# Patient Record
Sex: FEMALE | Race: WHITE
[De-identification: names, ages, dates, MRNs, and addresses within clinical notes are randomized per-mention and may not be internally consistent; named-entity substitution may affect disease eponyms.]

---

## 2018-05-09 ENCOUNTER — HOSPITAL ENCOUNTER (OUTPATIENT)
Dept: HOSPITAL 17 - NEPE | Age: 48
Setting detail: OBSERVATION
LOS: 1 days | Discharge: HOME | End: 2018-05-10
Attending: INTERNAL MEDICINE | Admitting: INTERNAL MEDICINE
Payer: COMMERCIAL

## 2018-05-09 VITALS
OXYGEN SATURATION: 97 % | HEART RATE: 88 BPM | DIASTOLIC BLOOD PRESSURE: 56 MMHG | TEMPERATURE: 98.5 F | SYSTOLIC BLOOD PRESSURE: 104 MMHG | RESPIRATION RATE: 18 BRPM

## 2018-05-09 VITALS
DIASTOLIC BLOOD PRESSURE: 72 MMHG | OXYGEN SATURATION: 99 % | HEART RATE: 84 BPM | SYSTOLIC BLOOD PRESSURE: 133 MMHG | RESPIRATION RATE: 19 BRPM

## 2018-05-09 VITALS
SYSTOLIC BLOOD PRESSURE: 136 MMHG | TEMPERATURE: 97.6 F | DIASTOLIC BLOOD PRESSURE: 73 MMHG | HEART RATE: 89 BPM | RESPIRATION RATE: 20 BRPM

## 2018-05-09 VITALS
RESPIRATION RATE: 16 BRPM | TEMPERATURE: 98.2 F | DIASTOLIC BLOOD PRESSURE: 64 MMHG | OXYGEN SATURATION: 96 % | HEART RATE: 87 BPM | SYSTOLIC BLOOD PRESSURE: 117 MMHG

## 2018-05-09 VITALS — OXYGEN SATURATION: 96 %

## 2018-05-09 VITALS — HEART RATE: 84 BPM

## 2018-05-09 DIAGNOSIS — N32.81: ICD-10-CM

## 2018-05-09 DIAGNOSIS — Z79.899: ICD-10-CM

## 2018-05-09 DIAGNOSIS — K21.9: ICD-10-CM

## 2018-05-09 DIAGNOSIS — I10: ICD-10-CM

## 2018-05-09 DIAGNOSIS — R11.0: ICD-10-CM

## 2018-05-09 DIAGNOSIS — R94.31: ICD-10-CM

## 2018-05-09 DIAGNOSIS — R07.89: Primary | ICD-10-CM

## 2018-05-09 DIAGNOSIS — R61: ICD-10-CM

## 2018-05-09 DIAGNOSIS — E66.9: ICD-10-CM

## 2018-05-09 DIAGNOSIS — Z82.49: ICD-10-CM

## 2018-05-09 DIAGNOSIS — R06.02: ICD-10-CM

## 2018-05-09 LAB
ALBUMIN SERPL-MCNC: 3.6 GM/DL (ref 3.4–5)
ALP SERPL-CCNC: 117 U/L (ref 45–117)
ALT SERPL-CCNC: 22 U/L (ref 10–53)
AST SERPL-CCNC: 14 U/L (ref 15–37)
BASOPHILS # BLD AUTO: 0.1 TH/MM3 (ref 0–0.2)
BASOPHILS NFR BLD: 0.5 % (ref 0–2)
BILIRUB SERPL-MCNC: 0.3 MG/DL (ref 0.2–1)
BUN SERPL-MCNC: 12 MG/DL (ref 7–18)
CALCIUM SERPL-MCNC: 9 MG/DL (ref 8.5–10.1)
CHLORIDE SERPL-SCNC: 99 MEQ/L (ref 98–107)
CREAT SERPL-MCNC: 0.76 MG/DL (ref 0.5–1)
EOSINOPHIL # BLD: 0.1 TH/MM3 (ref 0–0.4)
EOSINOPHIL NFR BLD: 1.2 % (ref 0–4)
ERYTHROCYTE [DISTWIDTH] IN BLOOD BY AUTOMATED COUNT: 14.3 % (ref 11.6–17.2)
GFR SERPLBLD BASED ON 1.73 SQ M-ARVRAT: 81 ML/MIN (ref 89–?)
GLUCOSE SERPL-MCNC: 97 MG/DL (ref 74–106)
HCO3 BLD-SCNC: 29.4 MEQ/L (ref 21–32)
HCT VFR BLD CALC: 41.7 % (ref 35–46)
HGB BLD-MCNC: 14 GM/DL (ref 11.6–15.3)
INR PPP: 0.9 RATIO
LYMPHOCYTES # BLD AUTO: 3.4 TH/MM3 (ref 1–4.8)
LYMPHOCYTES NFR BLD AUTO: 29.3 % (ref 9–44)
MCH RBC QN AUTO: 29.2 PG (ref 27–34)
MCHC RBC AUTO-ENTMCNC: 33.6 % (ref 32–36)
MCV RBC AUTO: 86.8 FL (ref 80–100)
MONOCYTE #: 0.6 TH/MM3 (ref 0–0.9)
MONOCYTES NFR BLD: 5.6 % (ref 0–8)
NEUTROPHILS # BLD AUTO: 7.3 TH/MM3 (ref 1.8–7.7)
NEUTROPHILS NFR BLD AUTO: 63.4 % (ref 16–70)
PLATELET # BLD: 453 TH/MM3 (ref 150–450)
PMV BLD AUTO: 6.9 FL (ref 7–11)
PROT SERPL-MCNC: 8 GM/DL (ref 6.4–8.2)
PROTHROMBIN TIME: 9.6 SEC (ref 9.8–11.6)
RBC # BLD AUTO: 4.8 MIL/MM3 (ref 4–5.3)
SODIUM SERPL-SCNC: 136 MEQ/L (ref 136–145)
TROPONIN I SERPL-MCNC: (no result) NG/ML (ref 0.02–0.05)
WBC # BLD AUTO: 11.5 TH/MM3 (ref 4–11)

## 2018-05-09 PROCEDURE — 83690 ASSAY OF LIPASE: CPT

## 2018-05-09 PROCEDURE — 71045 X-RAY EXAM CHEST 1 VIEW: CPT

## 2018-05-09 PROCEDURE — 93005 ELECTROCARDIOGRAM TRACING: CPT

## 2018-05-09 PROCEDURE — A9502 TC99M TETROFOSMIN: HCPCS

## 2018-05-09 PROCEDURE — 93017 CV STRESS TEST TRACING ONLY: CPT

## 2018-05-09 PROCEDURE — G0378 HOSPITAL OBSERVATION PER HR: HCPCS

## 2018-05-09 PROCEDURE — 84443 ASSAY THYROID STIM HORMONE: CPT

## 2018-05-09 PROCEDURE — 85025 COMPLETE CBC W/AUTO DIFF WBC: CPT

## 2018-05-09 PROCEDURE — 85610 PROTHROMBIN TIME: CPT

## 2018-05-09 PROCEDURE — 82550 ASSAY OF CK (CPK): CPT

## 2018-05-09 PROCEDURE — 96374 THER/PROPH/DIAG INJ IV PUSH: CPT

## 2018-05-09 PROCEDURE — 80053 COMPREHEN METABOLIC PANEL: CPT

## 2018-05-09 PROCEDURE — 85730 THROMBOPLASTIN TIME PARTIAL: CPT

## 2018-05-09 PROCEDURE — 78452 HT MUSCLE IMAGE SPECT MULT: CPT

## 2018-05-09 PROCEDURE — 84484 ASSAY OF TROPONIN QUANT: CPT

## 2018-05-09 PROCEDURE — 96361 HYDRATE IV INFUSION ADD-ON: CPT

## 2018-05-09 PROCEDURE — 99285 EMERGENCY DEPT VISIT HI MDM: CPT

## 2018-05-09 PROCEDURE — 96376 TX/PRO/DX INJ SAME DRUG ADON: CPT

## 2018-05-09 RX ADMIN — PHENYTOIN SODIUM SCH MLS/HR: 50 INJECTION INTRAMUSCULAR; INTRAVENOUS at 11:01

## 2018-05-09 RX ADMIN — Medication SCH ML: at 22:46

## 2018-05-09 RX ADMIN — HYDROCODONE BITARTRATE AND ACETAMINOPHEN PRN TAB: 5; 325 TABLET ORAL at 23:00

## 2018-05-09 RX ADMIN — PHENYTOIN SODIUM SCH MLS/HR: 50 INJECTION INTRAMUSCULAR; INTRAVENOUS at 22:59

## 2018-05-09 RX ADMIN — HYDROCODONE BITARTRATE AND ACETAMINOPHEN PRN TAB: 5; 325 TABLET ORAL at 17:08

## 2018-05-09 NOTE — EKG
Date Performed: 05/09/2018       Time Performed: 10:34:28

 

PTAGE:      47 years

 

EKG:      Sinus rhythm 

 

 LOW QRS VOLTAGE IN PRECORDIAL LEADS MODERATE T-WAVE ABNORMALITY, CONSIDER ANTERIOR ISCHEMIA ABNORMAL
 ECG

 

PREVIOUS TRACING       : 02/22/2013 00.26

 

DOCTOR:   Domenic Florian  Interpretating Date/Time  05/09/2018 12:27:55

## 2018-05-09 NOTE — HHI.HP
[facility]


Primary Care Physician


Bg Butler MD, PhD


Chief Complaint


Chest pain


History of Present Illness


This is a 47-year-old female that presents to the ED with complaint of 

developing 2 episodes of left-sided chest discomfort.  First episode began 3 

weeks ago at 12:30 in the morning.  When asked to describe it, she states it 

was a pain.  Lasted 30 minutes but did not recur.  Then this morning it 

reoccurred while at work.  Describes it as "excruciating pain."  She states she 

is diaphoretic, nauseous, and short of breath.  States still there but not as 

intense.  Certain movements seem to worsen it.  Denies recent illness.  Cannot 

recall anything that may have caused discomfort.  Denies knowledge of any 

coronary disease.  Upon reviewing records she had a normal heart 

catheterization 2013 after having an abnormal Ruddy protocol ETT the 

office of Dr. Jose Alex.  States that she has not needed to follow 

cardiologist since then.  Has history of hypertension.  Denies hyperlipidemia 

and diabetes.  Lifetime non-smoker.





Review of Systems


General: Patient denies fevers, chills recent, and recent travel


HEENT: Patient denies headache, sore throat, difficulty swallowing.  


Cardiovascular: Has the chest discomfort as mentioned above.  Denies sensation 

of heart beating rapidly or irregularly.  No syncope.  She was diaphoretic this 

morning.


Respiratory: She was short of breath this morning.  Denies inspirational chest 

discomfort.  Denies coughing wheezing or hemoptysis.  


GI: She was nauseous this morning.  Patient denies vomiting, diarrhea, 

abdominal pain, bloody stools.


Musculoskeletal: Patient denies joint pain or edema.  Denies calf pain or edema.


Neurovascular: Patient denies numbness, tingling, weakness in extremities.  

Denies headache.


Endocrine: Denies polyuria and polydipsia.


Hematologic: Denies easy bruising.


Skin: Denies rash or itching.





Past Family Social History


Allergies:  


Coded Allergies:  


     codeine (Unverified  Allergy, Intermediate, "SEVERE VOMITING", 8/15/17)


     penicillin G (Unverified  Allergy, Intermediate, "EXTREME HIVES", 8/15/17)


     latex (Unverified  Allergy, Mild, RASH, 8/15/17)


 STATES SHE DOES NOT BELIEVE SHE HAS PROBLEMS WITH LATEX


     Iodine and Iodide Containing Produc (Verified  Allergy, Unknown, 18)


Past Medical History


Hypertension and overactive bladder.  Denies hyperlipidemia, diabetes, and CAD.


Past Surgical History


Cardiac catheterization in 2013 revealing normal coronary arteries.  She has 

had 2 bladder surgeries.  Hysterectomy.


Reported Medications





Reported Meds & Active Scripts


Active


Reported


Lisinopril 10 Mg Tab 10 Mg PO DAILY


Sumatriptan (Sumatriptan Succinate) 100 Mg Tab 100 Mg PO ONCE PRN


     If a satisfactory response has not been obtained at 2 hours, a


     second


     dose may be administered


Pantoprazole (Pantoprazole Sodium) 40 Mg Tab 40 Mg PO DAILY


Levothyroxine (Levothyroxine Sodium) 88 Mcg Tab 88 Mcg PO DAILY


Hydrochlorothiazide 25 Mg Tab 25 Mg PO DAILY


Escitalopram (Escitalopram Oxalate) 20 Mg Tab 20 Mg PO DAILY


Cephalexin 500 Mg Cap 500 Mg PO Q8H


Alprazolam 0.5 Mg Tab 0.5 Mg PO Q8H PRN


Active Ordered Medications





Current Medications








 Medications


  (Trade)  Dose


 Ordered  Sig/Mike


 Route  Start Time


 Stop Time Status Last Admin


 


 Sodium Chloride  1,000 ml @ 


 100 mls/hr  Q10H


 IV  18 11:00


    18 11:01


 


 


  (NS Flush)  2 ml  UNSCH  PRN


 IV FLUSH  18 12:00


     


 


 


  (NS Flush)  2 ml  BID


 IV FLUSH  18 21:00


     


 


 


  (Tylenol)  500 mg  Q4H  PRN


 PO  18 12:00


     


 


 


  (Zofran Inj)  4 mg  Q6H  PRN


 IV PUSH  18 12:00


     


 


 


  (Xanax)  0.5 mg  Q8H  PRN


 PO  18 12:45


     


 


 


  (Lexapro)  20 mg  DAILY


 PO  5/10/18 09:00


     


 


 


  (Hydrodiuril)  25 mg  DAILY


 PO  5/10/18 09:00


     


 


 


  (Synthroid)  88 mcg  DAILY@0600


 PO  5/10/18 06:00


     


 


 


  (Prinivil)  10 mg  DAILY


 PO  5/10/18 09:00


     


 


 


  (Protonix)  40 mg  DAILY


 PO  5/10/18 09:00


     


 








Family History


Both parents had CAD.  Her brother had an MI at age 53.


Social History


Lifetime non-smoker.  Occasional alcohol.  Denies illicit drug use.





Physical Exam


Vital Signs





Vital Signs








  Date Time  Temp Pulse Resp B/P (MAP) Pulse Ox O2 Delivery O2 Flow Rate FiO2


 


18 14:00        


 


18 13:00     96 Room Air  


 


18 10:42  84 19 133/72 (92) 99 Room Air  


 


18 10:19 97.6 89 20 136/73 (94)    








Physical Exam


GENERAL: This is a well-nourished, well-developed patient, in no apparent 

distress.  Patient speaks in clear complete sentences.  Patient is pleasant.


HEENT: Head is atraumatic and normocephalic.  Neck is supple without 

lymphadenopathy and trachea is midline.  No JVD or carotid bruits.


CARDIOVASCULAR: Regular rate and rhythm without murmurs, gallops, or rubs. 


RESPIRATORY: Clear to auscultation. Breath sounds equal bilaterally. No wheezes

, rales, or rhonchi.  Chest wall is tender.  No use of accessory muscles.


GASTROINTESTINAL: Abdomen is nontender, nondistended.  Abdomen soft.  No 

obvious pulsatile mass or bruit.  No CVA tenderness.  Strong femoral pulses 

bilaterally.  Normal bowel sounds in all quadrants.


MUSCULOSKELETAL: Patient is moving upper and lower extremities freely.  No calf 

tenderness or edema, no Homans sign.  Strong pulses in upper and lower 

extremities.


NEUROLOGICAL: Patient is alert and oriented.  Cranial nerves 2-12 are grossly 

intact.  No focal deficits and speech is clear.


SKIN: No rash and turgor is normal.


Laboratory





Laboratory Tests








Test


  18


10:50 18


13:50


 


White Blood Count 11.5  


 


Red Blood Count 4.80  


 


Hemoglobin 14.0  


 


Hematocrit 41.7  


 


Mean Corpuscular Volume 86.8  


 


Mean Corpuscular Hemoglobin 29.2  


 


Mean Corpuscular Hemoglobin


Concent 33.6 


  


 


 


Red Cell Distribution Width 14.3  


 


Platelet Count 453  


 


Mean Platelet Volume 6.9  


 


Neutrophils (%) (Auto) 63.4  


 


Lymphocytes (%) (Auto) 29.3  


 


Monocytes (%) (Auto) 5.6  


 


Eosinophils (%) (Auto) 1.2  


 


Basophils (%) (Auto) 0.5  


 


Neutrophils # (Auto) 7.3  


 


Lymphocytes # (Auto) 3.4  


 


Monocytes # (Auto) 0.6  


 


Eosinophils # (Auto) 0.1  


 


Basophils # (Auto) 0.1  


 


CBC Comment DIFF FINAL  


 


Differential Comment   


 


Prothrombin Time 9.6  


 


Prothromb Time International


Ratio 0.9 


  


 


 


Activated Partial


Thromboplast Time 28.7 


  


 


 


Blood Urea Nitrogen 12  


 


Creatinine 0.76  


 


Random Glucose 97  


 


Total Protein 8.0  


 


Albumin 3.6  


 


Calcium Level 9.0  


 


Alkaline Phosphatase 117  


 


Aspartate Amino Transf


(AST/SGOT) 14 


  


 


 


Alanine Aminotransferase


(ALT/SGPT) 22 


  


 


 


Total Bilirubin 0.3  


 


Sodium Level 136  


 


Potassium Level 3.7  


 


Chloride Level 99  


 


Carbon Dioxide Level 29.4  


 


Anion Gap 8  


 


Estimat Glomerular Filtration


Rate 81 


  


 


 


Total Creatine Kinase 67  


 


Troponin I LESS THAN 0.02  


 


Thyroid Stimulating Hormone


3rd Gen 1.880 


  


 








Result Diagram:  


18 1050                                                                    

            18 1050





Imaging





Last 48 hours Impressions








Chest X-Ray 18 1052 Signed





Impressions: 





 Service Date/Time:  Wednesday, May 9, 2018 11:01 - CONCLUSION:  1. No acute 





 cardiopulmonary disease.     Neo Fraga MD 








Course


Initial EKG is sinus rhythm with nonspecific anterolateral ST changes which 

were also present on prior EKG..





Caprini VTE Risk Assessment


Caprini VTE Risk Assessment:  No/Low Risk (score <= 1)


Caprini Risk Assessment Model











 Point Value = 1          Point Value = 2  Point Value = 3  Point Value = 5


 


Age 41-60


Minor surgery


BMI > 25 kg/m2


Swollen legs


Varicose veins


Pregnancy or postpartum


History of unexplained or recurrent


   spontaneous 


Oral contraceptives or hormone


   replacement


Sepsis (< 1 month)


Serious lung disease, including


   pneumonia (< 1 month)


Abnormal pulmonary function


Acute myocardial infarction


Congestive heart failure (< 1 month)


History of inflammatory bowel disease


Medical patient at bed rest Age 61-74


Arthroscopic surgery


Major open surgery (> 45 min)


Laparoscopic surgery (> 45 min)


Malignancy


Confined to bed (> 72 hours)


Immobilizing plaster cast


Central venous access Age >= 75


History of VTE


Family history of VTE


Factor V Leiden


Prothrombin 43237N


Lupus anticoagulant


Anticardiolipin antibodies


Elevated serum homocysteine


Heparin-induced thrombocytopenia


Other congenital or acquired


   thrombophilia Stroke (< 1 month)


Elective arthroplasty


Hip, pelvis, or leg fracture


Acute spinal cord injury (< 1 month)








Prophylaxis Regimen











   Total Risk


Factor Score Risk Level Prophylaxis Regimen


 


0-1      Low Early ambulation


 


2 Moderate Order ONE of the following:


*Sequential Compression Device (SCD)


*Heparin 5000 units SQ BID


 


3-4 Higher Order ONE of the following medications:


*Heparin 5000 units SQ TID


*Enoxaparin/Lovenox 40 mg SQ daily (WT < 150 kg, CrCl > 30 mL/min)


*Enoxaparin/Lovenox 30 mg SQ daily (WT < 150 kg, CrCl > 10-29 mL/min)


*Enoxaparin/Lovenox 30 mg SQ BID (WT < 150 kg, CrCl > 30 mL/min)


AND/OR


*Sequential Compression Device (SCD)


 


5 or more Highest Order ONE of the following medications:


*Heparin 5000 units SQ TID (Preferred with Epidurals)


*Enoxaparin/Lovenox 40 mg SQ daily (WT < 150 kg, CrCl > 30 mL/min)


*Enoxaparin/Lovenox 30 mg SQ daily (WT < 150 kg, CrCl > 10-29 mL/min)


*Enoxaparin/Lovenox 30 mg SQ BID (WT < 150 kg, CrCl > 30 mL/min)


AND


*Sequential Compression Device (SCD)











Assessment and Plan


Assessment and Plan


* Chest pain: Patient will continue to have serial cardiac enzymes and EKGs for 

ruling out purposes.  She will be seen by Dr. Jose Alex of cardiology in 

the chest pain center.  She will be given Toradol for her chest wall 

discomfort.  Patient states she can take hydrocodone for discomfort, this will 

be provided as well.  Patient likely will have a Lexiscan in the morning if 

ruled out.  Patient likely be discharged home if her stress test is nonischemic 

with instructions to follow-up with PCP and to return to ED for interval issues.


* Hypertension: Continue lisinopril.


* Obesity: Patient counseled on importance of diet, exercise, and weight loss.


Patient is stable at this time.  She is agreeable to this plan.











Margarito Perez May 9, 2018 14:42

## 2018-05-09 NOTE — PD
HPI


Chief Complaint:  Chest Pain


Time Seen by Provider:  10:44


Travel History


International Travel<30 days:  No


Contact w/Intl Traveler<30days:  No


Traveled to known affect area:  No





History of Present Illness


HPI


47-year-old female complains of chest pain.  Patient states that the chest pain 

started this morning around 7 AM.  Patient states that the pain did seem to be 

a squeezing pressure pain localized to left chest.  Patient denies any pain 

radiation.  Patient states that she had nausea and diaphoresis with the chest 

pain.  Patient states that the pain is better now.  Patient complains of mild 

aching headache and generalized malaise and fatigue.  Patient denies any 

coughing congestion fever chills.  Patient denies abdominal pain.  Patient has 

history of chest pain in the past.  Patient states that she felt a stress test 

and had cardiac cath done in 2013.  Patient states that the cardiac cath was 

normal.  Patient has history of hypertension.  Patient denies history of 

diabetes, hyperlipidemia.  Patient is a non-smoker.  Patient has family history 

of heart disease.  Patient states that she had similar episode of chest pain 

about 3 weeks ago.  Patient did not see a physician for that.  On a scale of 1-

10 the chest pain is a 4 now.





PFSH


Past Medical History


Autoimmune Disease:  No


Blood Disorders:  No


Anxiety:  No


Depression:  Yes


Heart Rhythm Problems:  No


Cancer:  No


Cardiovascular Problems:  Yes


High Cholesterol:  No


Chest Pain:  No


Congestive Heart Failure:  No


Cerebrovascular Accident:  No


Diabetes:  No


Endocrine:  Yes


GERD:  Yes


Genitourinary:  Yes (PLEASE SEE SURGICAL HISTORY)


Hypertension:  Yes


Immune Disorder:  No


Musculoskeletal:  No


Neurologic:  Yes


Psychiatric:  Yes


Reproductive:  No


Respiratory:  No


Migraines:  Yes


Seizures:  No


Thyroid Disease:  Yes


Pregnant?:  Not Pregnant





Past Surgical History


Abdominal Surgery:  No


AICD:  No


Arteriovenous Shunt:  No


Cardiac Surgery:  No


Ear Surgery:  No


Endocrine Surgery:  No


Eye Surgery:  No


Genitourinary Surgery:  Yes (BLADDER X 2, PUMP X 1 IN ANUS)


Gynecologic Surgery:  Yes (HYSTERECTEMY)


Hysterectomy:  Yes


Insulin Pump:  No


Joint Replacement:  No


Oral Surgery:  No


Pacemaker:  No


Thoracic Surgery:  No





Social History


Alcohol Use:  Yes (WEEKENDS)


Tobacco Use:  No


Substance Use:  No





Allergies-Medications


(Allergen,Severity, Reaction):  


Coded Allergies:  


     codeine (Unverified  Allergy, Intermediate, "SEVERE VOMITING", 8/15/17)


     penicillin G (Unverified  Allergy, Intermediate, "EXTREME HIVES", 8/15/17)


     latex (Unverified  Allergy, Mild, RASH, 8/15/17)


 STATES SHE DOES NOT BELIEVE SHE HAS PROBLEMS WITH LATEX


     Iodine and Iodide Containing Produc (Verified  Allergy, Unknown, 5/9/18)


Reported Meds & Prescriptions





Reported Meds & Active Scripts


Active


Reported


Lisinopril 10 Mg Tab 10 Mg PO DAILY


Sumatriptan (Sumatriptan Succinate) 100 Mg Tab 100 Mg PO ONCE PRN


     If a satisfactory response has not been obtained at 2 hours, a


     second


     dose may be administered


Phenazopyridine (Phenazopyridine HCl) 200 Mg Tab 200 Mg PO Q8H PRN


Pantoprazole (Pantoprazole Sodium) 40 Mg Tab 40 Mg PO DAILY


Levothyroxine (Levothyroxine Sodium) 88 Mcg Tab 88 Mcg PO DAILY


Hydrochlorothiazide 25 Mg Tab 25 Mg PO DAILY


Sm Allergy Relief Nasal S (Fluticasone Propionate (Nasal)) 50 Mcg/Actuation Spr

   


Fluconazole 200 Mg Tab 200 Mg PO DAILY


Escitalopram (Escitalopram Oxalate) 20 Mg Tab 20 Mg PO DAILY


Diphenhydramine (Diphenhydramine HCl) 25 Mg Cap 25 Mg PO HS PRN


Cranberry Urinary Comfort (Vitamins C & E) 1 Cap 1 Cap PO DAILY


Cephalexin 500 Mg Cap 500 Mg PO Q8H


Alprazolam 0.5 Mg Tab 0.5 Mg PO Q8H PRN








Review of Systems


General / Constitutional:  No: Fever


Eyes:  No: Visual changes


HENT:  No: Headaches


Cardiovascular:  Positive: Chest Pain or Discomfort


Respiratory:  No: Shortness of Breath


Gastrointestinal:  No: Abdominal Pain


Genitourinary:  No: Dysuria


Musculoskeletal:  No: Pain


Skin:  No Rash


Neurologic:  No: Weakness


Psychiatric:  No: Depression


Endocrine:  No: Polydipsia


Hematologic/Lymphatic:  No: Easy Bruising





Physical Exam


Narrative


GENERAL: Well-nourished, well-developed patient.


SKIN: Focused skin assessment warm/dry.


HEAD: Normocephalic.


EYES: No scleral icterus. No injection or drainage. 


NECK: Supple, trachea midline. No JVD or lymphadenopathy.


CARDIOVASCULAR: Regular rate and rhythm without murmurs, gallops, or rubs. 


RESPIRATORY: Breath sounds equal bilaterally. No accessory muscle use.


GASTROINTESTINAL: Abdomen soft, non-tender, nondistended. 


MUSCULOSKELETAL: No cyanosis, or edema. 


BACK: Nontender without obvious deformity. No CVA tenderness.


Neurologic exam normal.





Data


Data


Last Documented VS





Vital Signs








  Date Time  Temp Pulse Resp B/P (MAP) Pulse Ox O2 Delivery O2 Flow Rate FiO2


 


5/9/18 10:42  84 19 133/72 (92) 99 Room Air  


 


5/9/18 10:19 97.6       








Orders





 Orders


Electrocardiogram (5/9/18 )


Electrocardiogram (5/9/18 10:52)


Complete Blood Count With Diff (5/9/18 10:52)


Comprehensive Metabolic Panel (5/9/18 10:52)


Creatine Kinase (Cpk) (5/9/18 10:52)


Troponin I (5/9/18 10:52)


Prothrombin Time / Inr (Pt) (5/9/18 10:52)


Act Partial Throm Time (Ptt) (5/9/18 10:52)


Thyroid Stimulating Hormone (5/9/18 10:52)


Chest, Single Ap (5/9/18 10:52)


Iv Access Insert/Monitor (5/9/18 10:52)


Ecg Monitoring (5/9/18 10:52)


Oximetry (5/9/18 10:52)


Aspirin (Aspirin) (5/9/18 11:00)


Sodium Chlor 0.9% 1000 Ml Inj (Ns 1000 M (5/9/18 11:00)


Admit Order (Ed Use Only) (5/9/18 11:56)


Activity Bed Rest With Brp (5/9/18 11:56)


Vital Signs (Adult) Q4H (5/9/18 11:56)


Cardiac Rhythm .As Directed (5/9/18 11:56)


Notify Dr: Other .PRN (5/9/18 11:56)


Notify DrJeannine Parameters (5/9/18 11:56)


Resp Oxygen Nasal Cannula (5/9/18 )


Diet Npo (5/9/18 Lunch)


Ckmb (Isoenzyme) Profile (5/9/18 13:50)


Ckmb (Isoenzyme) Profile (5/9/18 16:50)


Troponin I (5/9/18 13:50)


Troponin I (5/9/18 16:50)


Electrocardiogram (5/9/18 13:50)


Electrocardiogram (5/9/18 16:50)


^ Obtain (5/9/18 11:56)


Sodium Chloride 0.9% Flush (Ns Flush) (5/9/18 12:00)


Sodium Chloride 0.9% Flush (Ns Flush) (5/9/18 21:00)


Acetaminophen (Tylenol) (5/9/18 12:00)


Ondansetron Inj (Zofran Inj) (5/9/18 12:00)


Cardiac Monitor / Telemetry DAYRON.Q8H (5/9/18 11:56)





Labs





Laboratory Tests








Test


  5/9/18


10:50


 


White Blood Count 11.5 TH/MM3 


 


Red Blood Count 4.80 MIL/MM3 


 


Hemoglobin 14.0 GM/DL 


 


Hematocrit 41.7 % 


 


Mean Corpuscular Volume 86.8 FL 


 


Mean Corpuscular Hemoglobin 29.2 PG 


 


Mean Corpuscular Hemoglobin


Concent 33.6 % 


 


 


Red Cell Distribution Width 14.3 % 


 


Platelet Count 453 TH/MM3 


 


Mean Platelet Volume 6.9 FL 


 


Neutrophils (%) (Auto) 63.4 % 


 


Lymphocytes (%) (Auto) 29.3 % 


 


Monocytes (%) (Auto) 5.6 % 


 


Eosinophils (%) (Auto) 1.2 % 


 


Basophils (%) (Auto) 0.5 % 


 


Neutrophils # (Auto) 7.3 TH/MM3 


 


Lymphocytes # (Auto) 3.4 TH/MM3 


 


Monocytes # (Auto) 0.6 TH/MM3 


 


Eosinophils # (Auto) 0.1 TH/MM3 


 


Basophils # (Auto) 0.1 TH/MM3 


 


CBC Comment DIFF FINAL 


 


Differential Comment  


 


Prothrombin Time 9.6 SEC 


 


Prothromb Time International


Ratio 0.9 RATIO 


 


 


Activated Partial


Thromboplast Time 28.7 SEC 


 


 


Blood Urea Nitrogen 12 MG/DL 


 


Creatinine 0.76 MG/DL 


 


Random Glucose 97 MG/DL 


 


Total Protein 8.0 GM/DL 


 


Albumin 3.6 GM/DL 


 


Calcium Level 9.0 MG/DL 


 


Alkaline Phosphatase 117 U/L 


 


Aspartate Amino Transf


(AST/SGOT) 14 U/L 


 


 


Alanine Aminotransferase


(ALT/SGPT) 22 U/L 


 


 


Total Bilirubin 0.3 MG/DL 


 


Sodium Level 136 MEQ/L 


 


Potassium Level 3.7 MEQ/L 


 


Chloride Level 99 MEQ/L 


 


Carbon Dioxide Level 29.4 MEQ/L 


 


Anion Gap 8 MEQ/L 


 


Estimat Glomerular Filtration


Rate 81 ML/MIN 


 


 


Total Creatine Kinase 67 U/L 


 


Troponin I


  LESS THAN 0.02


NG/ML


 


Thyroid Stimulating Hormone


3rd Gen 1.880 uIU/ML 


 











MDM


Medical Decision Making


Medical Screen Exam Complete:  Yes


Emergency Medical Condition:  Yes


Interpretation(s)


EKG shows sinus rhythm with T-wave inversion in aVR, V1, V2, V3 V4 V5 and V6.  

Unchanged from previous EKG.


Last Impressions








Chest X-Ray 5/9/18 1052 Signed





Impressions: 





 Service Date/Time:  Wednesday, May 9, 2018 11:01 - CONCLUSION:  1. No acute 





 cardiopulmonary disease.     Neo Fraga MD 





11:52 AM.  CBC within normal limits.  CMP within normal limits.  Cardiac 

enzymes are normal.


Differential Diagnosis


Differential diagnosis including angina, MI, PE, pneumothorax.


Narrative Course


47-year-old female with chest pain.  History of chest pain in the past and 

normal cardiac cath in 2013.  Aspirin 325 mg p.o. given.





Diagnosis





 Primary Impression:  


 Chest pain


 Qualified Codes:  R07.9 - Chest pain, unspecified





Admitting Information


Admitting Physician Requests:  Adán Nelson MD May 9, 2018 10:59

## 2018-05-09 NOTE — RADRPT
EXAM DATE/TIME:  05/09/2018 11:01 

 

[facility] COMPARISON:     

CHEST SINGLE AP, February 21, 2013, 12:43.

 

                     

INDICATIONS :     

Chest pains with pressure mid sternal

                     

 

MEDICAL HISTORY :     

None.          

 

SURGICAL HISTORY :     

None.   

 

ENCOUNTER:     

Initial                                        

 

ACUITY:     

1 day      

 

PAIN SCORE:     

Non-responsive.

 

LOCATION:     

Bilateral chest 

 

FINDINGS:     

A single view of the chest demonstrates the lungs to be symmetrically aerated without evidence of mas
s, infiltrate or effusion.  The cardiomediastinal contours are unremarkable.  Osseous structures are 
intact.

 

CONCLUSION:     

1. No acute cardiopulmonary disease.

 

 

 

 Neo Fraga MD on May 09, 2018 at 11:33           

Board Certified Radiologist.

 This report was verified electronically.

## 2018-05-10 VITALS — HEART RATE: 88 BPM

## 2018-05-10 VITALS
TEMPERATURE: 97.7 F | HEART RATE: 70 BPM | RESPIRATION RATE: 18 BRPM | OXYGEN SATURATION: 96 % | SYSTOLIC BLOOD PRESSURE: 93 MMHG | DIASTOLIC BLOOD PRESSURE: 56 MMHG

## 2018-05-10 VITALS
TEMPERATURE: 97.7 F | SYSTOLIC BLOOD PRESSURE: 105 MMHG | RESPIRATION RATE: 18 BRPM | OXYGEN SATURATION: 95 % | DIASTOLIC BLOOD PRESSURE: 54 MMHG | HEART RATE: 69 BPM

## 2018-05-10 VITALS
DIASTOLIC BLOOD PRESSURE: 77 MMHG | RESPIRATION RATE: 18 BRPM | HEART RATE: 72 BPM | OXYGEN SATURATION: 96 % | TEMPERATURE: 98.1 F | SYSTOLIC BLOOD PRESSURE: 129 MMHG

## 2018-05-10 VITALS
TEMPERATURE: 97.7 F | HEART RATE: 65 BPM | OXYGEN SATURATION: 97 % | RESPIRATION RATE: 18 BRPM | DIASTOLIC BLOOD PRESSURE: 53 MMHG | SYSTOLIC BLOOD PRESSURE: 99 MMHG

## 2018-05-10 VITALS — HEART RATE: 67 BPM

## 2018-05-10 RX ADMIN — HYDROCODONE BITARTRATE AND ACETAMINOPHEN PRN TAB: 5; 325 TABLET ORAL at 06:27

## 2018-05-10 RX ADMIN — HYDROCODONE BITARTRATE AND ACETAMINOPHEN PRN TAB: 5; 325 TABLET ORAL at 12:17

## 2018-05-10 RX ADMIN — Medication SCH ML: at 08:35

## 2018-05-10 NOTE — HHI.DCPOC
Discharge Care Plan


Diagnosis:  


(1) Chest pain


(2) Hypertension


Goals to Promote Your Health


* To prevent worsening of your condition and complications


* To maintain your health at the optimal level


Directions to Meet Your Goals


*** Take your medications as prescribed


*** Follow your dietary instruction


*** Follow activity as directed








*** Keep your appointments as scheduled


*** Take your immunizations and boosters as scheduled


*** If your symptoms worsen call your PCP, if no PCP go to Urgent Care Center 

or Emergency Room***


*** Smoking is Dangerous to Your Health. Avoid second hand smoke***


***Call the 24-hour hour crisis hotline for domestic abuse at [phone]***











Margarito Perez May 10, 2018 12:11

## 2018-05-10 NOTE — EKG
Date Performed: 05/09/2018       Time Performed: 13:57:09

 

PTAGE:      47 years

 

EKG:      Sinus rhythm 

 

 LOW QRS VOLTAGE IN PRECORDIAL LEADS MODERATE T-WAVE ABNORMALITY, CONSIDER ANTERIOR ISCHEMIA ABNORMAL
 ECG

 

PREVIOUS TRACING       : 05/09/2018 10.34 Since previous tracing, no significant change noted

 

DOCTOR:   Giovany Goldstein  Interpretating Date/Time  05/10/2018 14:21:03

## 2018-05-10 NOTE — TR
Date Performed: 05/10/2018       Time Performed: 10:07:33

 

DOCTOR:      Giovany Goldstein 

 

DRUG LIST:     

CLINICAL HISTORY:     

REASON FOR TEST:     

REASON FOR ENDING:     

OBSERVATION:     

CONCLUSION:     

COMMENTS:      Lexiscan stress test was performed under standard four minute protocol.  Radionuclide 
was injected one minute prior to ending the test. No electrocardiographic abormalities were present t
o suggest ischemia. Nuclear imaging and interpretation are pending.

## 2018-05-10 NOTE — RADRPT
EXAM DATE/TIME:  05/10/2018 09:39 

 

[facility] COMPARISON:     

No previous studies available for comparison.

 

 

INDICATIONS :     

Left chest pain with nausea and diaphoresis. Angina. Abnormal EKG.

                           

 

DOSE:     

35.0 mCi Tc99m Myoview at stress.

                     11.0 mCi Tc99m Myoview at rest.

                     0.4 mg Lexiscan

                       

 

 

STRESS SYMPTOMS:      

Headache, dyspnea, and chest pain.

                       

 

 

EJECTION FRACTION:       

65%

                       

 

MEDICAL HISTORY :     

Hypertension. Gastroesophageal reflux disease.  

 

SURGICAL HISTORY :      

Hysterectomy.   Coronary catherization.

 

ENCOUNTER:     

Initial

 

ACUITY:     

1 day

 

PAIN SCALE:     

6/10

 

LOCATION:      

Left chest 

 

TECHNIQUE:     

The patient underwent pharmacologic stress with infusion of prescribed dose.  Continuous ECG tracing 
was monitored during stress.  Gated SPECT imaging was performed after stress and conventional SPECT i
maging was performed at rest.  The examination was performed on a SPECT/CT scanner, both attenuation 
and non-corrected datasets were reviewed.

 

FINDINGS:     

 

DISTRIBUTION:     

The maximum perfused segment at stress is in the anterolateral wall.

 

PERFUSION STUDY:     

The pattern of perfusion at stress is within normal limits except for small fixed defect at the cardi
ac apex.

 

GATED STUDY:     

There is intact wall motion and thickening without hypokinetic or dyskinetic segments. 

 

CONCLUSION:     

1. Small fixed perfusion defect at the cardiac apex most characteristic of prior small infarct. No re
versibility to suggest ischemia.

2. Normal ejection fraction at 65%.

 

RISK CATEGORY:     

Intermediate (1-3% Annual Mortality Rate)

 

 

 

 

 Del Sarmiento MD on May 10, 2018 at 11:32           

Board Certified Radiologist.

 This report was verified electronically.

## 2018-05-10 NOTE — EKG
Date Performed: 05/09/2018       Time Performed: 16:31:35

 

PTAGE:      47 years

 

EKG:      Sinus rhythm 

 

 LOW QRS VOLTAGE IN PRECORDIAL LEADS MODERATE INTRAVENTRICULAR CONDUCTION DELAY BORDERLINE ECG

 

PREVIOUS TRACING       : 05/09/2018 13.57 Since previous tracing, no significant change noted

 

DOCTOR:   Giovany Goldstein  Interpretating Date/Time  05/10/2018 14:15:39